# Patient Record
Sex: FEMALE | Race: WHITE | NOT HISPANIC OR LATINO | Employment: OTHER | URBAN - METROPOLITAN AREA
[De-identification: names, ages, dates, MRNs, and addresses within clinical notes are randomized per-mention and may not be internally consistent; named-entity substitution may affect disease eponyms.]

---

## 2017-01-24 ENCOUNTER — TRANSFERRED RECORDS (OUTPATIENT)
Dept: HEALTH INFORMATION MANAGEMENT | Facility: CLINIC | Age: 62
End: 2017-01-24

## 2017-02-16 ENCOUNTER — OFFICE VISIT (OUTPATIENT)
Dept: NEUROLOGY | Facility: CLINIC | Age: 62
End: 2017-02-16

## 2017-02-16 VITALS — RESPIRATION RATE: 18 BRPM | SYSTOLIC BLOOD PRESSURE: 116 MMHG | HEART RATE: 85 BPM | DIASTOLIC BLOOD PRESSURE: 61 MMHG

## 2017-02-16 DIAGNOSIS — G44.201 INTRACTABLE TENSION-TYPE HEADACHE, UNSPECIFIED CHRONICITY PATTERN: Primary | ICD-10-CM

## 2017-02-16 DIAGNOSIS — S06.0X0A CONCUSSION, WITHOUT LOSS OF CONSCIOUSNESS, INITIAL ENCOUNTER: ICD-10-CM

## 2017-02-16 DIAGNOSIS — M54.2 NECK PAIN: ICD-10-CM

## 2017-02-16 RX ORDER — TRIMETHOBENZAMIDE HYDROCHLORIDE 300 MG/1
300 CAPSULE ORAL 3 TIMES DAILY PRN
COMMUNITY
End: 2017-02-16

## 2017-02-16 RX ORDER — PROMETHAZINE HYDROCHLORIDE 25 MG/1
TABLET ORAL EVERY 6 HOURS PRN
COMMUNITY

## 2017-02-16 RX ORDER — SENNOSIDES 8.6 MG
1 TABLET ORAL DAILY
COMMUNITY

## 2017-02-16 RX ORDER — MECLIZINE HCL 25MG 25 MG/1
25 TABLET, CHEWABLE ORAL EVERY 6 HOURS PRN
COMMUNITY

## 2017-02-16 RX ORDER — ZINC OXIDE 20 %
OINTMENT (GRAM) TOPICAL PRN
COMMUNITY

## 2017-02-16 RX ORDER — POLYETHYLENE GLYCOL 3350 17 G/17G
1 POWDER, FOR SOLUTION ORAL DAILY
COMMUNITY

## 2017-02-16 RX ORDER — SUCRALFATE 1 G/1
1 TABLET ORAL 4 TIMES DAILY
COMMUNITY

## 2017-02-16 RX ORDER — CYANOCOBALAMIN 1000 UG/ML
1 INJECTION, SOLUTION INTRAMUSCULAR; SUBCUTANEOUS
COMMUNITY

## 2017-02-16 ASSESSMENT — PAIN SCALES - GENERAL: PAINLEVEL: SEVERE PAIN (6)

## 2017-02-16 NOTE — MR AVS SNAPSHOT
After Visit Summary   2/16/2017    Shirlene Mena    MRN: [id number]           Patient Information     Date Of Birth          1955        Visit Information        Provider Department      2/16/2017 12:30 PM Jose G Medina MD [facility]        Today's Diagnoses     Intractable tension-type headache, unspecified chronicity pattern    -  1    Neck pain        Concussion, without loss of consciousness, initial encounter           Follow-ups after your visit        Follow-up notes from your care team     Discussed this visit Return in about 1 week (around 2/23/2017).      Future tests that were ordered for you today     Open Future Orders        Priority Expected Expires Ordered    CT Head w/o Contrast Routine 2/22/2017 2/16/2018 2/16/2017    CT Cervical Spine w/o Contrast Routine 2/21/2017 2/16/2018 2/16/2017            Who to contact     Please call your clinic at [phone] to:    Ask questions about your health    Make or cancel appointments    Discuss your medicines    Learn about your test results    Speak to your doctor   If you have compliments or concerns about an experience at your clinic, or if you wish to file a complaint, please contact [facility] Patient Relations at [phone] or email us at [email or web address]         Additional Information About Your Visit        MyChart Information     Rosum is an electronic gateway that provides easy, online access to your medical records. With Rosum, you can request a clinic appointment, read your test results, renew a prescription or communicate with your care team.     To sign up for nCinot visit the website at [email or web address]   You will be asked to enter the access code listed below, as well as some personal information. Please follow the directions to create your username and password.     Your access code is: [email or web address]  Expires:  2017  1:13 PM     Your access code will  in 90 days. If you need help or a new code, please contact your [facility] or call [phone] for assistance.        Care EveryWhere ID     This is your Care EveryWhere ID. This could be used by other organizations to access your [place] medical records  [id number]        Your Vitals Were     Pulse Respirations                85 18           Blood Pressure from Last 3 Encounters:   17 116/61    Weight from Last 3 Encounters:   No data found for Wt                 Today's Medication Changes          These changes are accurate as of: 17  1:13 PM.  If you have any questions, ask your nurse or doctor.               These medicines have changed or have updated prescriptions.        Dose/Directions    amitriptyline 25 MG tablet   Commonly known as:  ELAVIL   This may have changed:    - medication strength  - how much to take   Used for:  Intractable tension-type headache, unspecified chronicity pattern, Neck pain, Concussion, without loss of consciousness, initial encounter        Dose:  50 mg   Take 2 tablets (50 mg) by mouth At Bedtime   Quantity:  60 tablet   Refills:  1            Where to get your medicines      These medications were sent to [place] [address] [place] [id number] [facility] [address]     Phone:  [phone]     amitriptyline 25 MG tablet                Primary Care Provider Office Phone # Fax #    Ivette Gandhi [phone]       [facility] [address] [facility] [address]        Thank you!     Thank you for choosing [facility]  for your care. Our goal is always to provide you with excellent care. Hearing back from our patients is one way we can continue to improve our services. Please take a few minutes to complete the written survey that you may receive in the mail after your visit with us.  Thank you!             Your Updated Medication List - Protect others around you: Learn how to safely use, store and throw away your medicines at www.disposemymeds.org.          This list is accurate as of: 2/16/17  1:13 PM.  Always use your most recent med list.                   Brand Name Dispense Instructions for use    amitriptyline 25 MG tablet    ELAVIL    60 tablet    Take 2 tablets (50 mg) by mouth At Bedtime       BENZONATATE PO      Take 100 mg by mouth 3 times daily as needed for cough       cyanocobalamin 1000 MCG/ML injection    VITAMIN B12     Inject 1 mL into the muscle every 30 days       LEVOTHYROXINE SODIUM PO      Take 88 mcg by mouth daily       meclizine 25 MG Chew      Take 25 mg by mouth every 6 hours as needed for dizziness       pantoprazole suspension    PROTONIX     Take 40 mg by mouth daily       polyethylene glycol Packet    MIRALAX/GLYCOLAX     Take 1 packet by mouth daily       promethazine 25 MG tablet    PHENERGAN     Take by mouth every 6 hours as needed for nausea       sennosides 8.6 MG tablet    SENOKOT     Take 1 tablet by mouth daily       sucralfate 1 GM tablet    CARAFATE     Take 1 g by mouth 4 times daily       TRAMADOL HCL PO      Take 50 mg by mouth 3 times daily       TRAZODONE HCL PO      Take 100 mg by mouth At Bedtime       VITAMIN D (CHOLECALCIFEROL) PO      Take 1,000 Units by mouth daily       ZANAFLEX PO      Take 4 mg by mouth once       zinc oxide 20 % ointment      Apply topically as needed for dry skin or irritation       ZOCOR PO      Take 20 mg by mouth At Bedtime       ZOFRAN ODT PO      Take 4 mg by mouth once
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
## 2017-02-20 ENCOUNTER — TRANSFERRED RECORDS (OUTPATIENT)
Dept: HEALTH INFORMATION MANAGEMENT | Facility: CLINIC | Age: 62
End: 2017-02-20

## 2017-02-24 ENCOUNTER — OFFICE VISIT (OUTPATIENT)
Dept: NEUROLOGY | Facility: CLINIC | Age: 62
End: 2017-02-24

## 2017-02-24 VITALS — HEART RATE: 84 BPM | SYSTOLIC BLOOD PRESSURE: 120 MMHG | DIASTOLIC BLOOD PRESSURE: 55 MMHG | RESPIRATION RATE: 18 BRPM

## 2017-02-24 DIAGNOSIS — S06.0X0A CONCUSSION, WITHOUT LOSS OF CONSCIOUSNESS, INITIAL ENCOUNTER: Primary | ICD-10-CM

## 2017-02-24 DIAGNOSIS — G44.201 INTRACTABLE TENSION-TYPE HEADACHE, UNSPECIFIED CHRONICITY PATTERN: ICD-10-CM

## 2017-02-24 DIAGNOSIS — M54.2 NECK PAIN: ICD-10-CM

## 2017-02-24 ASSESSMENT — PAIN SCALES - GENERAL: PAINLEVEL: SEVERE PAIN (7)
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
## 2017-07-13 ENCOUNTER — OFFICE VISIT (OUTPATIENT)
Dept: NEUROLOGY | Facility: CLINIC | Age: 62
End: 2017-07-13

## 2017-07-13 VITALS — DIASTOLIC BLOOD PRESSURE: 74 MMHG | HEART RATE: 78 BPM | SYSTOLIC BLOOD PRESSURE: 104 MMHG | RESPIRATION RATE: 18 BRPM

## 2017-07-13 DIAGNOSIS — G44.201 INTRACTABLE TENSION-TYPE HEADACHE, UNSPECIFIED CHRONICITY PATTERN: Primary | ICD-10-CM

## 2017-07-13 DIAGNOSIS — M54.2 NECK PAIN: ICD-10-CM

## 2017-07-13 RX ORDER — GABAPENTIN 100 MG/1
CAPSULE ORAL
Qty: 180 CAPSULE | Refills: 1 | Status: SHIPPED | OUTPATIENT
Start: 2017-07-13 | End: 2017-08-10 | Stop reason: SINTOL

## 2017-07-13 ASSESSMENT — PAIN SCALES - GENERAL: PAINLEVEL: NO PAIN (0)
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
## 2017-08-10 ENCOUNTER — OFFICE VISIT (OUTPATIENT)
Dept: NEUROLOGY | Facility: CLINIC | Age: 62
End: 2017-08-10

## 2017-08-10 VITALS — SYSTOLIC BLOOD PRESSURE: 118 MMHG | DIASTOLIC BLOOD PRESSURE: 75 MMHG | HEART RATE: 79 BPM

## 2017-08-10 DIAGNOSIS — M54.2 NECK PAIN: Primary | ICD-10-CM

## 2017-08-10 ASSESSMENT — PAIN SCALES - GENERAL: PAINLEVEL: MODERATE PAIN (4)
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
## 2017-08-10 NOTE — MR AVS SNAPSHOT
After Visit Summary   8/10/2017    Shirlene Mena    MRN: [id number]           Patient Information     Date Of Birth          1955        Visit Information        Provider Department      8/10/2017 11:30 AM Jose G Medina MD [facility]         Follow-ups after your visit        Follow-up notes from your care team     Discussed this visit Return if symptoms worsen or fail to improve.      Who to contact     Please call your clinic at [phone] to:    Ask questions about your health    Make or cancel appointments    Discuss your medicines    Learn about your test results    Speak to your doctor   If you have compliments or concerns about an experience at your clinic, or if you wish to file a complaint, please contact [facility] Patient Relations at [phone] or email us at [email or web address]         Additional Information About Your Visit        MyChart Information     [employer] is an electronic gateway that provides easy, online access to your medical records. With Crossb[employer], you can request a clinic appointment, read your test results, renew a prescription or communicate with your care team.     To sign up for [employer] visit the website at [email or web address]   You will be asked to enter the access code listed below, as well as some personal information. Please follow the directions to create your username and password.     Your access code is: [email or web address]  Expires: 10/11/2017 10:15 AM     Your access code will  in 90 days. If you need help or a new code, please contact your [facility] or call [phone] for assistance.        Care EveryWhere ID     This is your Care EveryWhere ID. This could be used by other organizations to access your [place] medical records  [id number]        Your Vitals Were     Pulse                   79            Blood Pressure from  Last 3 Encounters:   08/10/17 118/75   07/13/17 104/74   02/24/17 120/55    Weight from Last 3 Encounters:   No data found for Wt              Today, you had the following     No orders found for display         Today's Medication Changes          These changes are accurate as of: 8/10/17 11:50 AM.  If you have any questions, ask your nurse or doctor.               These medicines have changed or have updated prescriptions.        Dose/Directions    AMITRIPTYLINE HCL PO   This may have changed:  Another medication with the same name was removed. Continue taking this medication, and follow the directions you see here.        Dose:  50 mg   Take 50 mg by mouth At Bedtime   Refills:  0         Stop taking these medicines if you haven't already. Please contact your care team if you have questions.     gabapentin 100 MG capsule   Commonly known as:  NEURONTIN                    Primary Care Provider Office Phone # Fax Iris Gandhi [phone]       [facility] [address] [facility] [address]        Equal Access to Services     YANETH MEJIA : Hadii nat ku hadasho Soomaali, waaxda luqadaha, qaybta kaalmada adeegyada, waxay jessicain cassandra jhaveri . So [facility] [phone].    ATENCIÓN: Si habla español, tiene a huston disposición servicios gratuitos de asistencia lingüística. Llame al [phone].    We comply with applicable federal civil rights laws and [place] laws. We do not discriminate on the basis of race, color, national origin, age, disability sex, sexual orientation or gender identity.            Thank you!     Thank you for choosing [facility]  for your care. Our goal is always to provide you with excellent care. Hearing back from our patients is one way we can continue to improve our services. Please take a few minutes to complete the written survey that you may receive in the mail after your visit with us. Thank  you!             Your Updated Medication List - Protect others around you: Learn how to safely use, store and throw away your medicines at www.disposemymeds.org.          This list is accurate as of: 8/10/17 11:50 AM.  Always use your most recent med list.                   Brand Name Dispense Instructions for use Diagnosis    AMITRIPTYLINE HCL PO      Take 50 mg by mouth At Bedtime        BENZONATATE PO      Take 100 mg by mouth 3 times daily as needed for cough        cyanocobalamin 1000 MCG/ML injection    VITAMIN B12     Inject 1 mL into the muscle every 30 days        LEVOTHYROXINE SODIUM PO      Take 88 mcg by mouth daily        meclizine 25 MG Chew      Take 25 mg by mouth every 6 hours as needed for dizziness        pantoprazole Susp suspension    PROTONIX     Take 40 mg by mouth daily        polyethylene glycol Packet    MIRALAX/GLYCOLAX     Take 1 packet by mouth daily        promethazine 25 MG tablet    PHENERGAN     Take by mouth every 6 hours as needed for nausea        sennosides 8.6 MG tablet    SENOKOT     Take 1 tablet by mouth daily        sucralfate 1 GM tablet    CARAFATE     Take 1 g by mouth 4 times daily        TRAMADOL HCL PO      Take 50 mg by mouth 3 times daily        TRAZODONE HCL PO      Take 100 mg by mouth At Bedtime        VITAMIN D (CHOLECALCIFEROL) PO      Take 1,000 Units by mouth daily        zinc oxide 20 % ointment      Apply topically as needed for dry skin or irritation        ZOCOR PO      Take 20 mg by mouth At Bedtime        ZOFRAN ODT PO      Take 4 mg by mouth once
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
## 2019-01-11 ENCOUNTER — COMMUNICATION - HEALTHEAST (OUTPATIENT)
Dept: SURGERY | Facility: CLINIC | Age: 64
End: 2019-01-11

## 2019-02-20 NOTE — MR AVS SNAPSHOT
After Visit Summary   2/24/2017    Shirlene Mena    MRN: [id number]           Patient Information     Date Of Birth          1955        Visit Information        Provider Department      2/24/2017 12:30 PM Jose G Medina MD [facility]        Today's Diagnoses     Concussion, without loss of consciousness, initial encounter    -  1    Neck pain        Intractable tension-type headache, unspecified chronicity pattern           Follow-ups after your visit        Additional Services     [facility] Referral       [facility]            Add therapy for neck pain.  [facility] [place] [address]  [phone]                  Follow-up notes from your care team     Discussed this visit Return in about 1 month (around 3/24/2017).      Future tests that were ordered for you today     Open Future Orders        Priority Expected Expires Ordered    [facility] Referral Routine 2/27/2017 2/24/2018 2/24/2017            Who to contact     Please call your clinic at [phone] to:    Ask questions about your health    Make or cancel appointments    Discuss your medicines    Learn about your test results    Speak to your doctor   If you have compliments or concerns about an experience at your clinic, or if you wish to file a complaint, please contact [facility] Patient Relations at [phone] or email us at [email or web address]         Additional Information About Your Visit        MyChart Information     [employer] is an electronic gateway that provides easy, online access to your medical records. With [employer], you can request a clinic appointment, read your test results, renew a prescription or communicate with your care team.     To sign up for [employer] visit the website at  [email or web address]   You will be asked to enter the access code listed below, as well as some personal information. Please follow the directions to create your username and password.     Your access code is: [email or web address]  Expires: 2017  1:13 PM     Your access code will  in 90 days. If you need help or a new code, please contact your [facility] or call [phone] for assistance.        Care EveryWhere ID     This is your Care EveryWhere ID. This could be used by other organizations to access your [facility] medical records  [id number]        Your Vitals Were     Pulse Respirations                84 18           Blood Pressure from Last 3 Encounters:   17 120/55   17 116/61    Weight from Last 3 Encounters:   No data found for Wt               Primary Care Provider Office Phone # Fax #    Ivette Gandhi [phone]       [facility] [address]  [address] [place] [address]        Thank you!     Thank you for choosing [facility]  for your care. Our goal is always to provide you with excellent care. Hearing back from our patients is one way we can continue to improve our services. Please take a few minutes to complete the written survey that you may receive in the mail after your visit with us. Thank you!             Your Updated Medication List - Protect others around you: Learn how to safely use, store and throw away your medicines at www.disposemymeds.org.          This list is accurate as of: 17 12:41 PM.  Always use your most recent med list.                   Brand Name Dispense Instructions for use    amitriptyline 25 MG tablet    ELAVIL    60 tablet    Take 2 tablets (50 mg) by mouth At Bedtime       BENZONATATE PO      Take 100 mg by mouth 3 times daily as needed for cough       cyanocobalamin 1000 MCG/ML injection    VITAMIN B12     Inject 1 mL into the muscle every 30  days       LEVOTHYROXINE SODIUM PO      Take 88 mcg by mouth daily       meclizine 25 MG Chew      Take 25 mg by mouth every 6 hours as needed for dizziness       pantoprazole suspension    PROTONIX     Take 40 mg by mouth daily       polyethylene glycol Packet    MIRALAX/GLYCOLAX     Take 1 packet by mouth daily       promethazine 25 MG tablet    PHENERGAN     Take by mouth every 6 hours as needed for nausea       sennosides 8.6 MG tablet    SENOKOT     Take 1 tablet by mouth daily       sucralfate 1 GM tablet    CARAFATE     Take 1 g by mouth 4 times daily       TRAMADOL HCL PO      Take 50 mg by mouth 3 times daily       TRAZODONE HCL PO      Take 100 mg by mouth At Bedtime       VITAMIN D (CHOLECALCIFEROL) PO      Take 1,000 Units by mouth daily       ZANAFLEX PO      Take 4 mg by mouth once       zinc oxide 20 % ointment      Apply topically as needed for dry skin or irritation       ZOCOR PO      Take 20 mg by mouth At Bedtime       ZOFRAN ODT PO      Take 4 mg by mouth once          98

## 2021-06-08 ENCOUNTER — RECORDS - HEALTHEAST (OUTPATIENT)
Dept: LAB | Facility: CLINIC | Age: 66
End: 2021-06-08

## 2021-06-08 LAB
ALBUMIN UR-MCNC: NEGATIVE G/DL
APPEARANCE UR: CLEAR
BACTERIA #/AREA URNS HPF: ABNORMAL /[HPF]
BILIRUB UR QL STRIP: NEGATIVE
COLOR UR AUTO: ABNORMAL
GLUCOSE UR STRIP-MCNC: NEGATIVE MG/DL
HGB UR QL STRIP: NEGATIVE
HYALINE CASTS: 1 LPF
KETONES UR STRIP-MCNC: NEGATIVE MG/DL
LEUKOCYTE ESTERASE UR QL STRIP: ABNORMAL
NITRATE UR QL: NEGATIVE
PH UR STRIP: 5 [PH] (ref 5–8)
RBC URINE: 1 HPF
SP GR UR STRIP: 1.01 (ref 1–1.03)
SQUAMOUS EPITHELIAL: <1 /HPF
UROBILINOGEN UR STRIP-ACNC: ABNORMAL
WBC URINE: 6 HPF

## 2021-06-09 LAB — BACTERIA SPEC CULT: NO GROWTH

## 2021-08-14 ENCOUNTER — HEALTH MAINTENANCE LETTER (OUTPATIENT)
Age: 66
End: 2021-08-14

## 2021-10-09 ENCOUNTER — HEALTH MAINTENANCE LETTER (OUTPATIENT)
Age: 66
End: 2021-10-09

## 2022-09-11 ENCOUNTER — HEALTH MAINTENANCE LETTER (OUTPATIENT)
Age: 67
End: 2022-09-11

## 2023-02-10 ENCOUNTER — TRANSCRIBE ORDERS (OUTPATIENT)
Dept: OTHER | Age: 68
End: 2023-02-10

## 2023-02-10 DIAGNOSIS — T50.905D ADVERSE EFFECT OF DRUG, SUBSEQUENT ENCOUNTER: Primary | ICD-10-CM

## 2023-04-05 NOTE — TELEPHONE ENCOUNTER
FUTURE VISIT INFORMATION      FUTURE VISIT INFORMATION:    Date: 6.28.23    Time: 7:30    [place] [facility] [place] INFORMATION:    Referring provider:  Leida    Referring providers clinic:  [facility]    Reason for visit/diagnosis  Adverse effect of drug, subsequent encounter; Referred to    Dr Coombs for drug allergy evaluation - Referred by Dr Jonathan Casarez MD at [facility] in [place] (Phone: [phone], Fax: [phone]) - Recs in Care Everywhere from [facility] - no other Recs - per Pt Shirlene    RECORDS REQUESTED FROM:       Clinic name Comments Records Status Imaging Status   [facility] 2.7.23  Leida CE    [facility] 1.20-1.23.23  [facility] 1.20.23  Vivienne CAICEDO

## 2023-06-28 ENCOUNTER — PRE VISIT (OUTPATIENT)
Dept: ALLERGY | Facility: CLINIC | Age: 68
End: 2023-06-28

## 2023-06-28 ENCOUNTER — OFFICE VISIT (OUTPATIENT)
Dept: ALLERGY | Facility: CLINIC | Age: 68
End: 2023-06-28
Payer: COMMERCIAL

## 2023-06-28 DIAGNOSIS — T50.905D ADVERSE EFFECT OF DRUG, SUBSEQUENT ENCOUNTER: ICD-10-CM

## 2023-06-28 DIAGNOSIS — R09.82 ALLERGIC RHINITIS WITH POSTNASAL DRIP: ICD-10-CM

## 2023-06-28 DIAGNOSIS — T88.7XXA DRUG-INDUCED HYPERSENSITIVITY SYNDROME, INITIAL ENCOUNTER: ICD-10-CM

## 2023-06-28 DIAGNOSIS — Z88.9 MULTIPLE DRUG ALLERGIES: Primary | ICD-10-CM

## 2023-06-28 DIAGNOSIS — Z88.9 ATOPY: ICD-10-CM

## 2023-06-28 DIAGNOSIS — J30.9 ALLERGIC RHINITIS WITH POSTNASAL DRIP: ICD-10-CM

## 2023-06-28 DIAGNOSIS — R05.3 CHRONIC COUGH: ICD-10-CM

## 2023-06-28 DIAGNOSIS — T50.905A DRUG-INDUCED HYPERSENSITIVITY SYNDROME, INITIAL ENCOUNTER: ICD-10-CM

## 2023-06-28 PROCEDURE — 95004 PERQ TESTS W/ALRGNC XTRCS: CPT | Performed by: DERMATOLOGY

## 2023-06-28 PROCEDURE — 99204 OFFICE O/P NEW MOD 45 MIN: CPT | Mod: 25 | Performed by: DERMATOLOGY

## 2023-06-28 NOTE — Clinical Note
6/28/2023         RE: Shirlene Mena  [address] [place] [address]        Dear Colleague,    Thank you for referring your patient, Shirlene Mena, to the [facility] [place]. Please see a copy of my visit note below.    [facility] Dermato-[facility]ology Note  Office visit  Encounter Date: Jun 28, 2023  ____________________________________________    CC: No chief complaint on file.      HPI:  (Jun 28, 2023)  Ms. Shirlene Mena is a(n) 68 year old female who presents today as new patient for allergy consultation    - patient has recurrent UTI's   --> see below the hx of Dr. Casarez.   Was taking 3 days the Ciprofloxacine and then developed fever, joint pain and a rash (first on arms and then all over the body, including face). Seems to be an erythematous, macular, confluent rash with pruritus. 3 weeks later the skin was peeling off.  --> labs from 1/20/2023 (2 weeks after onset of rash) = still elevated CRP 8.78 (UTI?), and still elevated Eosinophils absolute (0.8 normal <0.5) and then Glucose elevated 193 (probably the steroids). Liver parameters not elevated. CBC diff normal in June 2023    --> to Macrobid (Nitrofurantoin) about 10 years ago after few days also fever, rash and chills and afterwards peeling    --> cannot take Advil or Aleve because of gastric bypass surgery (2014). If pain takes only Tylenol. Cannot remember taking Tylenol. Took Tylenol after Jan 2023 without problems    - otherwise feeling well in usual state of health    Physical exam:  General: In no acute distress, well-developed, well-nourished  Eyes: no conjunctivitis  ENT: no signs of rhinitis   Pulmonary: no wheezing or coughing  Skin: Focused examination of the skin on test sites was performed = see test results below  {Skin Exam:791229}    Past Medical History:   Patient Active Problem List   Diagnosis     Intractable tension-type headache, unspecified chronicity pattern     Neck pain      Concussion, without loss of consciousness, initial encounter     No past medical history on file.    Allergies:  Allergies   Allergen Reactions     Azithromycin      Stomach pain      Codeine      Nausea      Lisinopril      coughing     Macrolides And Ketolides Hives     Sulfa Antibiotics        Medications:  Current Outpatient Medications   Medication     AMITRIPTYLINE HCL PO     BENZONATATE PO     cyanocobalamin (VITAMIN B12) 1000 MCG/ML injection     LEVOTHYROXINE SODIUM PO     meclizine 25 MG CHEW     Ondansetron (ZOFRAN ODT PO)     pantoprazole (PROTONIX) suspension     polyethylene glycol (MIRALAX/GLYCOLAX) Packet     promethazine (PHENERGAN) 25 MG tablet     sennosides (SENOKOT) 8.6 MG tablet     Simvastatin (ZOCOR PO)     sucralfate (CARAFATE) 1 GM tablet     TRAMADOL HCL PO     TRAZODONE HCL PO     VITAMIN D, CHOLECALCIFEROL, PO     zinc oxide 20 % ointment     No current facility-administered medications for this visit.       Social History:  The patient {works:720852}. Patient has the following hobbies or non-occupational exposure ***    Family History:  No family history on file.    Previous Labs, Allergy Tests, Dermatopathology, Imaging:    Dr. DEMETRIUS Casarez consult 02/07/2023  HPI: Shirlene has history of possible allergy to drug. Recently had urinary tract infection. This was on January 2. She took a course of Ceftin. Completed a course. Did not feel better. Thereafter started Cipro. Finished 3-day course. Then after 3 days she developed chills and on the fourth day developed fever and rash on the body. Rash was head to toe along with fever. She went to the emergency room. Along with fever she also had joint aches and pains. She wonders if this was an allergic reaction from the drug.      Referred By: Jonathan Casarez MD  [address] [place] [address]     Allergy Tests:    Past Allergy Test    Order for Future Allergy Testing:    [] Outpatient  [] Inpatient: [facility]..../ Bed ....       Skin Atopy  (atopic dermatitis) [] Yes   [x] No .........  Contact allergies:   [] Yes   [x] No ..........  Hand eczema:   [] Yes   [x] No           Leading hand:   [] R   [] L       [] Ambidextrous         Drug allergies:        [x] Yes   [] No  Which?.see above    Urticaria/Angioedema  [] Yes   [x] No .........  Food Allergy:  [] Yes   [x] No  which?......  Pets :  [] Yes   [x] No  Which?..no reactions to pets      []  Rhinitis   [] Conjunctivitis   [] Sinusitis   [] Polyposis   [] Otitis   [x] Pharyngitis         [x]  Postnasal drip    []  none  Operations:   [] Tonsils   [] Septum   [] Sinus   [] Polyposis        [] Asthma bronchiale   [x] Coughing      []  none  Symptoms (mostly Rhinoconjunctivitis and Asthma) aggravated by:  Season   [] I   [] II   [] III   [] IV   []V   []VI   []VII   []VIII   []IX   []X   []XI   []XII     [x] perennial   Day time      [] morning   [] noon      [] evening        [] night    [] whole day........  [x]  none  Location/changes    [] inside        [] outside   [] mountains    [] sea     [] others.............   [x]  none  Triggers, specific     [] animals     [] plants     [] dust              [] others ...........................    [x]  none  Triggers, others       [] work          [] psyche    [] sport            [] others .............................  [x]  none  Irritant                [] phys efforts [] smoke    [] heat/cold     [] odors  []others............... [x]  none    Order for PATCH TESTS  Reason for tests (suspected allergy): not necessary  Known previous allergies: n/a  Standardized panels  [] Standard panel (40 tests)  [] Preservatives & Antimicrobials (31 tests)  [] Emulsifiers & Additives (25 tests)   [] Perfumes/Flavours & Plants (25 tests)  [] Hairdresser panel (12 tests)  [] Rubber Chemicals (22 tests)  [] Plastics (26 tests)  [] Colorants/Dyes/Food additives (20 tests)  [] Metals (implants/dental) (24 tests)  [] Local anaesthetics/NSAIDs (13 tests)  [] Antibiotics &  Antimycotics (14 tests)   [] Corticosteroids (15 tests)   [] Photopatch test (62 tests)   [] others: ...      [] Patient's own products: ...    DO NOT test if chemical or biological identity is unknown!     always ask from patient the product information and safety sheets (MSDS)       Order for PRICK TESTS    Reason for tests (suspected allergy): atopy and perennial coughing and PND and Rhinitis  Known previous allergies: previous prick tests negatives    Standardized prick panels  [x] Atopic panel (20 tests)  [] Pediatric Panel (12 tests)  [] Milk, Meat, Eggs, Grains (20 tests)   [] Dust, Epithelia, Feathers (10 tests)  [] Fish, Seafood, Shellfish (17 tests)  [] Nuts, Beans (14 tests)  [] Spice, Vegetable, Fruit (17 tests)  [] Pollen Panel = Tree, Grass, Weed (24 tests)  [] Others: ...      [] Patient's own products: ...    DO NOT test if chemical or biological identity is unknown!     always ask from patient the product information and safety sheets (MSDS)     Standardized intradermal tests  [x] Penicillium notatum [x] Aspergillus fumigatus [x] House dust mites D.far & D. pteron  [] Cat    [] dog  [x] Others: Alternaria  [] Bee venom   [] Wasp venom  !!Specific protocol with dilutions!!       Order for Drug allergy tests (prick & Intradermal & patch tests)    [] Penicillin G  [] Ampicillin [] Cefazolin   [] Ceftriaxone   [] Ceftazidime  [x] Bactrim    [x] Others: Nitrofurantoin, Ciprofloxacin, Moxifloxacin  Order for ... as test date      Atopy Screen (Placed Jun 28, 2023)  No Substance Readings (15 min) Evaluation   POS Histamine 1mg/ml +    NEG NaCl 0.9% -      No Substance Readings (15 min) Evaluation   1 Alternaria alternata (tenuis)  -    2 Cladosporium herbarum -    3 Aspergillus fumigatus -    4 Penicillium notatum -    5 Dermatophagoides pteronyssinus -    6 Dermatophagoides farinae -    7 Dog epithelium (canis spp) -    8 Cat hair (adrian catus) -    9 Cockroach   (Blatella americana & germanica) -    10  Grass mix midwest   (Brittany, Orchard, Redtop, Sergio) -    11 Enzo grass (sorghum halepense) -    12 Weed mix   (common Cocklebur, Lamb s quarters, rough redroot Pigweed, Dock/Sorrel) -    13 Mug wort (artemisia vulgare) -    14 Ragweed giant/short (ambrosia spp) -    15 White birch (Betula papyrifera) -    16 Tree mix 1 (Pecan, Maple BHR, Oak RVW, american [place], black [place]) -    17 Red cedar (juniperus virginia) -    18 Tree mix 2   (white Vel, river/red Birch, black [place], common Kay, american Elm) -    19 Box elder/Maple mix (acer spp) -    20 [place] shagbark (carya ovata) -           Conclusion: no clear signs for immediate type reaction in prick test        ________________________________    Assessment & Plan:    ==> Final Diagnosis:     # multiple drug allergies    Suspicion for DRESS to Ciprofloxacine    Suspicion for DRESS? to Nitrofurantoin    Sulfa drugs (Bactrim) cannot remember    Azithromycine GI issues (pain) = side effects  {jgstatus:853230}    # suspicion for atopic predisposition with    Perennial PND, Rhinitis and coughing = allergic?  {jgstatus:749967}      These conclusions are made at the best of one's knowledge and belief based on the provided evidence such as patient's history and allergy test results and they can change over time or can be incomplete because of missing information's.    ==> Treatment Plan:  >> put Ciprofloxacin and Nitrofurantoin in Epic drug allergy list      Procedures Performed: Allergy tests, including prick tests    Staff:  Provider    Follow-up in Derm-Allergy clinic for drug allergy testing with delayed reading appointment 4-5 days later. Will do then as well IDT to environmental allergies    I spent a total of 40 minutes with Shirlene Mena. This time was spent counseling the patient and/or coordinating care, explaining the allergy tests , performing allergy tests and assessing the clinical relevance.        Again, thank you for allowing me to  participate in the care of your patient.        Sincerely,        Frantz Coombs MD

## 2023-06-28 NOTE — PROGRESS NOTES
[facility] Dermato-allergology Note  Office visit  Encounter Date: Jun 28, 2023  ____________________________________________    CC: No chief complaint on file.      HPI:  (Jun 28, 2023)  Ms. Shirlene Mena is a(n) 68 year old female who presents today as new patient for allergy consultation    - patient has recurrent UTI's   --> see below the hx of Dr. Casarez.   Was taking 3 days the Ciprofloxacine and then developed fever, joint pain and a rash (first on arms and then all over the body, including face). Seems to be an erythematous, macular, confluent rash with pruritus. 3 weeks later the skin was peeling off.  --> labs from 1/20/2023 (2 weeks after onset of rash) = still elevated CRP 8.78 (UTI?), and still elevated Eosinophils absolute (0.8 normal <0.5) and then Glucose elevated 193 (probably the steroids). Liver parameters not elevated. CBC diff normal in June 2023    --> to Macrobid (Nitrofurantoin) about 10 years ago after few days also fever, rash and chills and afterwards peeling    --> cannot take Advil or Aleve because of gastric bypass surgery (2014). If pain takes only Tylenol. Cannot remember taking Tylenol. Took Tylenol after Jan 2023 without problems    - otherwise feeling well in usual state of health    Physical exam:  General: In no acute distress, well-developed, well-nourished  Eyes: no conjunctivitis  ENT: no signs of rhinitis   Pulmonary: no wheezing or coughing  Skin: Focused examination of the skin on test sites was performed = see test results below      Past Medical History:   Patient Active Problem List   Diagnosis     Intractable tension-type headache, unspecified chronicity pattern     Neck pain     Concussion, without loss of consciousness, initial encounter     No past medical history on file.    Allergies:  Allergies   Allergen Reactions     Azithromycin      Stomach pain      Codeine      Nausea      Lisinopril      coughing     Macrolides And Ketolides Hives      Sulfa Antibiotics        Medications:  Current Outpatient Medications   Medication     AMITRIPTYLINE HCL PO     BENZONATATE PO     cyanocobalamin (VITAMIN B12) 1000 MCG/ML injection     LEVOTHYROXINE SODIUM PO     meclizine 25 MG CHEW     Ondansetron (ZOFRAN ODT PO)     pantoprazole (PROTONIX) suspension     polyethylene glycol (MIRALAX/GLYCOLAX) Packet     promethazine (PHENERGAN) 25 MG tablet     sennosides (SENOKOT) 8.6 MG tablet     Simvastatin (ZOCOR PO)     sucralfate (CARAFATE) 1 GM tablet     TRAMADOL HCL PO     TRAZODONE HCL PO     VITAMIN D, CHOLECALCIFEROL, PO     zinc oxide 20 % ointment     No current facility-administered medications for this visit.       Social History:  The patient is retired. Patient has the following hobbies or non-occupational exposure none    Family History:  No family history on file.    Previous Labs, Allergy Tests, Dermatopathology, Imaging:    Dr. DEMETRIUS Casarez consult 02/07/2023  HPI: Shirlene has history of possible allergy to drug. Recently had urinary tract infection. This was on January 2. She took a course of Ceftin. Completed a course. Did not feel better. Thereafter started Cipro. Finished 3-day course. Then after 3 days she developed chills and on the fourth day developed fever and rash on the body. Rash was head to toe along with fever. She went to the emergency room. Along with fever she also had joint aches and pains. She wonders if this was an allergic reaction from the drug.      Referred By: Jonathan Casarez MD  [address] [place] [address]     Allergy Tests:    Past Allergy Test    Order for Future Allergy Testing:    [] Outpatient  [] Inpatient: [facility]..../ Bed ....       Skin Atopy (atopic dermatitis) [] Yes   [x] No .........  Contact allergies:   [] Yes   [x] No ..........  Hand eczema:   [] Yes   [x] No           Leading hand:   [] R   [] L       [] Ambidextrous         Drug allergies:        [x] Yes   [] No  Which?.see  above    Urticaria/Angioedema  [] Yes   [x] No .........  Food Allergy:  [] Yes   [x] No  which?......  Pets :  [] Yes   [x] No  Which?..no reactions to pets      []  Rhinitis   [] Conjunctivitis   [] Sinusitis   [] Polyposis   [] Otitis   [x] Pharyngitis         [x]  Postnasal drip    []  none  Operations:   [] Tonsils   [] Septum   [] Sinus   [] Polyposis        [] Asthma bronchiale   [x] Coughing      []  none  Symptoms (mostly Rhinoconjunctivitis and Asthma) aggravated by:  Season   [] I   [] II   [] III   [] IV   []V   []VI   []VII   []VIII   []IX   []X   []XI   []XII     [x] perennial   Day time      [] morning   [] noon      [] evening        [] night    [] whole day........  [x]  none  Location/changes    [] inside        [] outside   [] mountains    [] sea     [] others.............   [x]  none  Triggers, specific     [] animals     [] plants     [] dust              [] others ...........................    [x]  none  Triggers, others       [] work          [] psyche    [] sport            [] others .............................  [x]  none  Irritant                [] phys efforts [] smoke    [] heat/cold     [] odors  []others............... [x]  none    Order for PATCH TESTS  Reason for tests (suspected allergy): not necessary  Known previous allergies: n/a  Standardized panels  [] Standard panel (40 tests)  [] Preservatives & Antimicrobials (31 tests)  [] Emulsifiers & Additives (25 tests)   [] Perfumes/Flavours & Plants (25 tests)  [] Hairdresser panel (12 tests)  [] Rubber Chemicals (22 tests)  [] Plastics (26 tests)  [] Colorants/Dyes/Food additives (20 tests)  [] Metals (implants/dental) (24 tests)  [] Local anaesthetics/NSAIDs (13 tests)  [] Antibiotics & Antimycotics (14 tests)   [] Corticosteroids (15 tests)   [] Photopatch test (62 tests)   [] others: ...      [] Patient's own products: ...    DO NOT test if chemical or biological identity is unknown!     always ask from patient the product  information and safety sheets (MSDS)       Order for PRICK TESTS    Reason for tests (suspected allergy): atopy and perennial coughing and PND and Rhinitis  Known previous allergies: previous prick tests negatives    Standardized prick panels  [x] Atopic panel (20 tests)  [] Pediatric Panel (12 tests)  [] Milk, Meat, Eggs, Grains (20 tests)   [] Dust, Epithelia, Feathers (10 tests)  [] Fish, Seafood, Shellfish (17 tests)  [] Nuts, Beans (14 tests)  [] Spice, Vegetable, Fruit (17 tests)  [] Pollen Panel = Tree, Grass, Weed (24 tests)  [] Others: ...      [] Patient's own products: ...    DO NOT test if chemical or biological identity is unknown!     always ask from patient the product information and safety sheets (MSDS)     Standardized intradermal tests  [x] Penicillium notatum [x] Aspergillus fumigatus [x] House dust mites D.far & D. pteron  [] Cat    [] dog  [x] Others: Alternaria  [] Bee venom   [] Wasp venom  !!Specific protocol with dilutions!!       Order for Drug allergy tests (prick & Intradermal & patch tests)    [] Penicillin G  [] Ampicillin [] Cefazolin   [] Ceftriaxone   [] Ceftazidime  [x] Bactrim    [x] Others: Nitrofurantoin, Ciprofloxacin, Moxifloxacin  Order for ... as test date      Atopy Screen (Placed Jun 28, 2023)  No Substance Readings (15 min) Evaluation   POS Histamine 1mg/ml +    NEG NaCl 0.9% -      No Substance Readings (15 min) Evaluation   1 Alternaria alternata (tenuis)  -    2 Cladosporium herbarum -    3 Aspergillus fumigatus -    4 Penicillium notatum -    5 Dermatophagoides pteronyssinus -    6 Dermatophagoides farinae -    7 Dog epithelium (canis spp) -    8 Cat hair (adrian catus) -    9 Cockroach   (Blatella americana & germanica) -    10 Grass mix midwest   (Brittany, Orchard, Redtop, Sergio) -    11 Enzo grass (sorghum halepense) -    12 Weed mix   (common Cocklebur, Lamb s quarters, rough redroot Pigweed, Dock/Sorrel) -    13 Mug wort (artemisia vulgare) -    14 Ragweed  giant/short (ambrosia spp) -    15 White birch (Betula papyrifera) -    16 Tree mix 1 (Pecan, Maple BHR, Oak RVW, american [place], black [place]) -    17 Red cedar (juniperus virginia) -    18 Tree mix 2   (white Vel, river/red Birch, black [place], common [place], american Elm) -    19 Box elder/Maple mix (acer spp) -    20 [place] shagbark (carya ovata) -           Conclusion: no clear signs for immediate type reaction in prick test        ________________________________    Assessment & Plan:    ==> Final Diagnosis:     # multiple drug allergies    Suspicion for DRESS to Ciprofloxacine    Suspicion for DRESS? to Nitrofurantoin    Sulfa drugs (Bactrim) cannot remember    Azithromycine GI issues (pain) = side effects  * acute illness with systemic symptoms    # suspicion for atopic predisposition with    Perennial PND, Rhinitis and coughing = allergic?  * stable chronic illness      These conclusions are made at the best of one's knowledge and belief based on the provided evidence such as patient's history and allergy test results and they can change over time or can be incomplete because of missing information's.    ==> Treatment Plan:  >> put Ciprofloxacin and Nitrofurantoin in Epic drug allergy list      Procedures Performed: Allergy tests, including prick tests    Staff:  Provider    Follow-up in Derm-Allergy clinic for drug allergy testing with delayed reading appointment 4-5 days later. Will do then as well IDT to environmental allergies    I spent a total of 40 minutes with Shirlene Mena. This time was spent counseling the patient and/or coordinating care, explaining the allergy tests , performing allergy tests and assessing the clinical relevance.

## 2023-06-28 NOTE — NURSING NOTE
"Chief Complaint   Patient presents with     Allergy Consult     Allergy consult, has become \"immune to most antibiotics\". States she became septic in hospital from taking cipro. Worked with bleach and it affected her airway, still has congestion/voice changes     Tari ENCISO RN-BSN  Dermatology Surgery  [phone]        "

## 2023-08-04 DIAGNOSIS — Z88.9 MULTIPLE DRUG ALLERGIES: Primary | ICD-10-CM

## 2023-08-04 RX ORDER — MOXIFLOXACIN HYDROCHLORIDE 400 MG/1
400 TABLET ORAL ONCE
Status: COMPLETED | OUTPATIENT
Start: 2023-08-04 | End: 2023-08-08

## 2023-08-04 RX ORDER — NITROFURANTOIN 25; 75 MG/1; MG/1
100 CAPSULE ORAL ONCE
Status: COMPLETED | OUTPATIENT
Start: 2023-08-08 | End: 2023-08-08

## 2023-08-04 NOTE — PROGRESS NOTES
[facility] Dermato-allergology Note  Office visit  Encounter Date: Aug 8, 2023  ____________________________________________    CC: No chief complaint on file.      HPI:  (Aug 8, 2023)  Ms. Shirlene Mena is a(n) 68 year old female who presents today as a return patient for allergy consultation  - Follow-up in Derm-Allergy clinic for drug allergy testing with delayed reading appointment 4-5 days later. Will do then as well IDT to environmental allergies  - otherwise feeling well in usual state of health    Physical exam:  General: In no acute distress, well-developed, well-nourished  Eyes: no conjunctivitis  ENT: no signs of rhinitis   Pulmonary: no wheezing or coughing  Skin:Focused examination of the skin on test sites was performed = see test results below    Earlier History and Allergy exams:  (Jun 28, 2023)    - patient has recurrent UTI's   --> see below the hx of Dr. Casarez.   Was taking 3 days the Ciprofloxacine and then developed fever, joint pain and a rash (first on arms and then all over the body, including face). Seems to be an erythematous, macular, confluent rash with pruritus. 3 weeks later the skin was peeling off.  --> labs from 1/20/2023 (2 weeks after onset of rash) = still elevated CRP 8.78 (UTI?), and still elevated Eosinophils absolute (0.8 normal <0.5) and then Glucose elevated 193 (probably the steroids). Liver parameters not elevated. CBC diff normal in June 2023    --> to Macrobid (Nitrofurantoin) about 10 years ago after few days also fever, rash and chills and afterwards peeling    --> cannot take Advil or Aleve because of gastric bypass surgery (2014). If pain takes only Tylenol. Cannot remember taking Tylenol. Took Tylenol after Jan 2023 without problems        Past Medical History:   Patient Active Problem List   Diagnosis     Intractable tension-type headache, unspecified chronicity pattern     Neck pain     Concussion, without loss of consciousness, initial  encounter     No past medical history on file.    Allergies:  Allergies   Allergen Reactions     Ciprofloxacin Dermatitis     DRESS to Ciprofloxacine     Nitrofurantoin Dermatitis     DRESS like symptoms to Macrobid     Codeine      Nausea      Lisinopril      coughing     Sulfa Antibiotics        Medications:  Current Outpatient Medications   Medication     AMITRIPTYLINE HCL PO     BENZONATATE PO     cyanocobalamin (VITAMIN B12) 1000 MCG/ML injection     LEVOTHYROXINE SODIUM PO     meclizine 25 MG CHEW     Ondansetron (ZOFRAN ODT PO)     pantoprazole (PROTONIX) suspension     polyethylene glycol (MIRALAX/GLYCOLAX) Packet     promethazine (PHENERGAN) 25 MG tablet     sennosides (SENOKOT) 8.6 MG tablet     Simvastatin (ZOCOR PO)     sucralfate (CARAFATE) 1 GM tablet     TRAMADOL HCL PO     TRAZODONE HCL PO     VITAMIN D, CHOLECALCIFEROL, PO     zinc oxide 20 % ointment     No current facility-administered medications for this visit.       Social History:  The patient is retired. Patient has the following hobbies or non-occupational exposure none    Family History:  Family History   Problem Relation Age of Onset     Parkinsonism Brother        Previous Labs, Allergy Tests, Dermatopathology, Imaging:    Dr. DEMETRIUS Casarez consult 02/07/2023  HPI: Shirlene has history of possible allergy to drug. Recently had urinary tract infection. This was on January 2. She took a course of Ceftin. Completed a course. Did not feel better. Thereafter started Cipro. Finished 3-day course. Then after 3 days she developed chills and on the fourth day developed fever and rash on the body. Rash was head to toe along with fever. She went to the emergency room. Along with fever she also had joint aches and pains. She wonders if this was an allergic reaction from the drug.      Referred By: Jonathan Casarez MD  [address] [place] [address]     Allergy Tests:    Past Allergy Test    Order for Future Allergy Testing:    [] Outpatient  []  Inpatient: [facility]..../ Bed ....       Skin Atopy (atopic dermatitis) [] Yes   [x] No .........  Contact allergies:   [] Yes   [x] No ..........  Hand eczema:   [] Yes   [x] No           Leading hand:   [] R   [] L       [] Ambidextrous         Drug allergies:        [x] Yes   [] No  Which?.see above    Urticaria/Angioedema  [] Yes   [x] No .........  Food Allergy:  [] Yes   [x] No  which?......  Pets :  [] Yes   [x] No  Which?..no reactions to pets      []  Rhinitis   [] Conjunctivitis   [] Sinusitis   [] Polyposis   [] Otitis   [x] Pharyngitis         [x]  Postnasal drip    []  none  Operations:   [] Tonsils   [] Septum   [] Sinus   [] Polyposis        [] Asthma bronchiale   [x] Coughing      []  none  Symptoms (mostly Rhinoconjunctivitis and Asthma) aggravated by:  Season   [] I   [] II   [] III   [] IV   []V   []VI   []VII   []VIII   []IX   []X   []XI   []XII     [x] perennial   Day time      [] morning   [] noon      [] evening        [] night    [] whole day........  [x]  none  Location/changes    [] inside        [] outside   [] mountains    [] sea     [] others.............   [x]  none  Triggers, specific     [] animals     [] plants     [] dust              [] others ...........................    [x]  none  Triggers, others       [] work          [] psyche    [] sport            [] others .............................  [x]  none  Irritant                [] phys efforts [] smoke    [] heat/cold     [] odors  []others............... [x]  none    Order for PATCH TESTS  Reason for tests (suspected allergy): not necessary  Known previous allergies: n/a  Standardized panels  [] Standard panel (40 tests)  [] Preservatives & Antimicrobials (31 tests)  [] Emulsifiers & Additives (25 tests)   [] Perfumes/Flavours & Plants (25 tests)  [] Hairdresser panel (12 tests)  [] Rubber Chemicals (22 tests)  [] Plastics (26 tests)  [] Colorants/Dyes/Food additives (20 tests)  [] Metals (implants/dental) (24 tests)  [] Local  anaesthetics/NSAIDs (13 tests)  [] Antibiotics & Antimycotics (14 tests)   [] Corticosteroids (15 tests)   [] Photopatch test (62 tests)   [] others: ...      [] Patient's own products: ...    DO NOT test if chemical or biological identity is unknown!     always ask from patient the product information and safety sheets (MSDS)       Order for PRICK TESTS    Reason for tests (suspected allergy): atopy and perennial coughing and PND and Rhinitis  Known previous allergies: previous prick tests negatives    Standardized prick panels  [x] Atopic panel (20 tests)  [] Pediatric Panel (12 tests)  [] Milk, Meat, Eggs, Grains (20 tests)   [] Dust, Epithelia, Feathers (10 tests)  [] Fish, Seafood, Shellfish (17 tests)  [] Nuts, Beans (14 tests)  [] Spice, Vegetable, Fruit (17 tests)  [] Pollen Panel = Tree, Grass, Weed (24 tests)  [] Others: ...      [] Patient's own products: ...    DO NOT test if chemical or biological identity is unknown!     always ask from patient the product information and safety sheets (MSDS)     Standardized intradermal tests  [x] Penicillium notatum [x] Aspergillus fumigatus [x] House dust mites D.far & D. pteron  [] Cat    [] dog  [x] Others: Alternaria  [] Bee venom   [] Wasp venom  !!Specific protocol with dilutions!!       Order for Drug allergy tests (prick & Intradermal & patch tests)    [] Penicillin G  [] Ampicillin [] Cefazolin   [] Ceftriaxone   [] Ceftazidime  [x] Bactrim    [x] Others: Nitrofurantoin, Ciprofloxacin, Moxifloxacin  Order for ... as test date      Atopy Screen (Placed Jun 28, 2023)  No Substance Readings (15 min) Evaluation   POS Histamine 1mg/ml +    NEG NaCl 0.9% -      No Substance Readings (15 min) Evaluation   1 Alternaria alternata (tenuis)  -    2 Cladosporium herbarum -    3 Aspergillus fumigatus -    4 Penicillium notatum -    5 Dermatophagoides pteronyssinus -    6 Dermatophagoides farinae -    7 Dog epithelium (canis spp) -    8 Cat hair (adrian catus) -    9  Cockroach   (Blatella americana & germanica) -    10 Grass mix midwest   (Brittany, Orchard, Redtop, Sergio) -    11 Enzo grass (sorghum halepense) -    12 Weed mix   (common Cocklebur, Lamb s quarters, rough redroot Pigweed, Dock/Sorrel) -    13 Mug wort (artemisia vulgare) -    14 Ragweed giant/short (ambrosia spp) -    15 White birch (Betula papyrifera) -    16 Tree mix 1 (Pecan, Maple BHR, Oak RVW, american [place], black [place]) -    17 Red cedar (juniperus virginia) -    18 Tree mix 2   (white Vel, river/red Birch, black [place], common [place], american Elm) -    19 Box elder/Maple mix (acer spp) -    20 [place] shagbark (carya ovata) -           Conclusion: no clear signs for immediate type reaction in prick test      Intradermal Testing (Placed Aug 8, 2023)  No Substance Conc.  Reading (15min)  immediate Papule [mm] / Erythema [mm] Reading   (... days)  delayed Papule [mm] / Erythema [mm] Remarks   DF Standard Dust Mite - D. Farinae 1:10 -   -    DP Standard Dust Mite - D. Pteronyssinus 1:10 -   -    A Aspergillus fumigatus  1:10 -   -    P Penicillium notatum 1:10 -   -     Alternaria alt 1:10 -   -    Conclusions: no immediate type reaction. We will observe if any delayed reaction        DRUG ALLERGY TEST SERIES Aug 8, 2023      No Substance Readings (15min) Evaluation   POS Histamine 1mg/ml +    NEG NaCl 0.9% -      ANTIBIOTICS    Prick Tests         Substance/ Allergen Conc Result (20 min) Remarks   1 Ciprofloxacin 2 mg/ml -    2 Moxifloxacin 400 mg -    3 Bactrim 80/400 mg/ml -    4 Nitrofurantoin Caps powder 100mg     5 Nitrofurantoin Caps yellow center ball 100mg        Intradermal Tests   immed immed delay delay      Substance Conc 1st dil  2nd dil  2 days  4 days remarks   1 Ciprofloxacin 1:1000 -       3 Bactrim 1:100 -           Patch Tests  as is as is 1:2 1:2     As Is  Vas Substance Conc 2 days 4 days 2 days 4 days remarks   1 2 Ciprofloxacin 2 mg/ml        3 4 Moxifloxacin 400 mg        5  6 Bactrim 80/400 mg/ml        7 8 Nitrofurantoin Caps powder 100mg        9 10 Nitrofurantoin Caps yellow center ball 100mg        Remove the patch tests on Thursday and draw the grid and then delayed readings and conclusions on Friday    ________________________________    Assessment & Plan:    ==> Final Diagnosis:     # multiple drug allergies    Suspicion for DRESS to Ciprofloxacine    Suspicion for DRESS? to Nitrofurantoin    Sulfa drugs (Bactrim) cannot remember    Azithromycine GI issues (pain) = side effects  * acute illness with systemic symptoms    # suspicion for atopic predisposition with    Perennial PND, Rhinitis and coughing = allergic?  * stable chronic illness      These conclusions are made at the best of one's knowledge and belief based on the provided evidence such as patient's history and allergy test results and they can change over time or can be incomplete because of missing information's.    ==> Treatment Plan:  >> put Ciprofloxacin and Nitrofurantoin in Epic drug allergy list      Procedures Performed: Allergy tests, including intradermal and patch tests and drug allergy tests    Staff: : provider    Follow-up in Derm-Allergy clinic for delayed readings on Friday  ___________________________    I spent a total of 40 minutes with Shirlene Mena during today s  visit. This time was spent discussing all the individual test results, correlating them to the clinical relevance, counseling the patient and/or coordinating care

## 2023-08-08 ENCOUNTER — OFFICE VISIT (OUTPATIENT)
Dept: ALLERGY | Facility: CLINIC | Age: 68
End: 2023-08-08
Payer: COMMERCIAL

## 2023-08-08 DIAGNOSIS — R05.3 CHRONIC COUGH: ICD-10-CM

## 2023-08-08 DIAGNOSIS — T88.7XXA DRUG-INDUCED HYPERSENSITIVITY SYNDROME, INITIAL ENCOUNTER: ICD-10-CM

## 2023-08-08 DIAGNOSIS — J30.9 ALLERGIC RHINITIS WITH POSTNASAL DRIP: ICD-10-CM

## 2023-08-08 DIAGNOSIS — R09.82 ALLERGIC RHINITIS WITH POSTNASAL DRIP: ICD-10-CM

## 2023-08-08 DIAGNOSIS — Z88.9 MULTIPLE DRUG ALLERGIES: Primary | ICD-10-CM

## 2023-08-08 DIAGNOSIS — T50.905A DRUG-INDUCED HYPERSENSITIVITY SYNDROME, INITIAL ENCOUNTER: ICD-10-CM

## 2023-08-08 PROCEDURE — 95024 IQ TESTS W/ALLERGENIC XTRCS: CPT | Performed by: DERMATOLOGY

## 2023-08-08 PROCEDURE — 95044 PATCH/APPLICATION TESTS: CPT | Performed by: DERMATOLOGY

## 2023-08-08 PROCEDURE — 99214 OFFICE O/P EST MOD 30 MIN: CPT | Mod: 25 | Performed by: DERMATOLOGY

## 2023-08-08 PROCEDURE — 95018 ALL TSTG PERQ&IQ DRUGS/BIOL: CPT | Performed by: DERMATOLOGY

## 2023-08-08 PROCEDURE — 95004 PERQ TESTS W/ALRGNC XTRCS: CPT | Performed by: DERMATOLOGY

## 2023-08-08 RX ADMIN — NITROFURANTOIN 100 MG: 25; 75 CAPSULE ORAL at 08:30

## 2023-08-08 RX ADMIN — MOXIFLOXACIN HYDROCHLORIDE 400 MG: 400 TABLET ORAL at 08:30

## 2023-08-08 NOTE — PATIENT INSTRUCTIONS
Removal of Patch Tests on Day 3:    Remove patches and tape from one test area (one rectangle)          Using the purple surgical markers provided (or other permanent marker), draw a grid around the test area so that the circular indentation is in the center of each square, as below. Try to be as neat as possible and keep the lines as straight as you can (you can use a ruler if you need to)          Redraw the number that was underneath the tape above the grids, as shown below. Try to print clearly.          Repeat the process for the remaining test areas.          Photograph the entire area then take close-up photos of any possible reactions. Examples of possible reactions are spots that look like these:          Return to clinic for evaluation as instructed. You should continue to avoid getting the area wet (no showering or strenuous exercise), exposing the area to UV light, using topical steroids, or scratching.         Who should I call with questions?  [facility], [place]: [phone]  For urgent needs outside of business hours call the [facility] at [phone] and ask for the dermatology resident on call      If you develop any serious or adverse allergic reaction after office hours please seek immediate medical assistance at the nearest clinic or emergency room

## 2023-08-08 NOTE — NURSING NOTE
Chief Complaint   Patient presents with    Allergy Recheck     Drug testing Bactrim, Nitrofurantoin CAM, Ciprofloxacin (P), Moxifloxacin CAM   Tari ENCISO RN-BSN  Dermatology Surgery  [phone]

## 2023-08-08 NOTE — Clinical Note
8/8/2023         RE: Shirlene Mena  [address]  [place] [address]        Dear Colleague,    Thank you for referring your patient, Shirlene Mena, to the [facility] [place]. Please see a copy of my visit note below.    [facility] Dermato-allergology Note  Office visit  Encounter Date: Aug 8, 2023  ____________________________________________    CC: No chief complaint on file.      HPI:  (Aug 8, 2023)  Ms. Shirlene Mena is a(n) 68 year old female who presents today as a return patient for allergy consultation  - Follow-up in Derm-Allergy clinic for drug allergy testing with delayed reading appointment 4-5 days later. Will do then as well IDT to environmental allergies  - otherwise feeling well in usual state of health    Physical exam:  General: In no acute distress, well-developed, well-nourished  Eyes: no conjunctivitis  ENT: no signs of rhinitis   Pulmonary: no wheezing or coughing  Skin:Focused examination of the skin on test sites was performed = see test results below    Earlier History and Allergy exams:  (Jun 28, 2023)    - patient has recurrent UTI's   --> see below the hx of Dr. Casarez.   Was taking 3 days the Ciprofloxacine and then developed fever, joint pain and a rash (first on arms and then all over the body, including face). Seems to be an erythematous, macular, confluent rash with pruritus. 3 weeks later the skin was peeling off.  --> labs from 1/20/2023 (2 weeks after onset of rash) = still elevated CRP 8.78 (UTI?), and still elevated Eosinophils absolute (0.8 normal <0.5) and then Glucose elevated 193 (probably the steroids). Liver parameters not elevated. CBC diff normal in June 2023    --> to Macrobid (Nitrofurantoin) about 10 years ago after few days also fever, rash and chills and afterwards peeling    --> cannot take Advil or Aleve because of gastric bypass surgery (2014). If pain takes only Tylenol. Cannot remember taking Tylenol. Took  Tylenol after Jan 2023 without problems        Past Medical History:   Patient Active Problem List   Diagnosis     Intractable tension-type headache, unspecified chronicity pattern     Neck pain     Concussion, without loss of consciousness, initial encounter     No past medical history on file.    Allergies:  Allergies   Allergen Reactions     Ciprofloxacin Dermatitis     DRESS to Ciprofloxacine     Nitrofurantoin Dermatitis     DRESS like symptoms to Macrobid     Codeine      Nausea      Lisinopril      coughing     Sulfa Antibiotics        Medications:  Current Outpatient Medications   Medication     AMITRIPTYLINE HCL PO     BENZONATATE PO     cyanocobalamin (VITAMIN B12) 1000 MCG/ML injection     LEVOTHYROXINE SODIUM PO     meclizine 25 MG CHEW     Ondansetron (ZOFRAN ODT PO)     pantoprazole (PROTONIX) suspension     polyethylene glycol (MIRALAX/GLYCOLAX) Packet     promethazine (PHENERGAN) 25 MG tablet     sennosides (SENOKOT) 8.6 MG tablet     Simvastatin (ZOCOR PO)     sucralfate (CARAFATE) 1 GM tablet     TRAMADOL HCL PO     TRAZODONE HCL PO     VITAMIN D, CHOLECALCIFEROL, PO     zinc oxide 20 % ointment     No current facility-administered medications for this visit.       Social History:  The patient is retired. Patient has the following hobbies or non-occupational exposure none    Family History:  Family History   Problem Relation Age of Onset     Parkinsonism Brother        Previous Labs, Allergy Tests, Dermatopathology, Imaging:    Dr. DEMETRIUS Casarez consult 02/07/2023  HPI: Shirlene has history of possible allergy to drug. Recently had urinary tract infection. This was on January 2. She took a course of Ceftin. Completed a course. Did not feel better. Thereafter started Cipro. Finished 3-day course. Then after 3 days she developed chills and on the fourth day developed fever and rash on the body. Rash was head to toe along with fever. She went to the emergency room. Along with fever she also had joint aches  and pains. She wonders if this was an allergic reaction from the drug.      Referred By: Jonathan Casarez MD  [address] [place] [address]     Allergy Tests:    Past Allergy Test    Order for Future Allergy Testing:    [] Outpatient  [] Inpatient: [facility]..../ Bed ....       Skin Atopy (atopic dermatitis) [] Yes   [x] No .........  Contact allergies:   [] Yes   [x] No ..........  Hand eczema:   [] Yes   [x] No           Leading hand:   [] R   [] L       [] Ambidextrous         Drug allergies:        [x] Yes   [] No  Which?.see above    Urticaria/Angioedema  [] Yes   [x] No .........  Food Allergy:  [] Yes   [x] No  which?......  Pets :  [] Yes   [x] No  Which?..no reactions to pets      []  Rhinitis   [] Conjunctivitis   [] Sinusitis   [] Polyposis   [] Otitis   [x] Pharyngitis         [x]  Postnasal drip    []  none  Operations:   [] Tonsils   [] Septum   [] Sinus   [] Polyposis        [] Asthma bronchiale   [x] Coughing      []  none  Symptoms (mostly Rhinoconjunctivitis and Asthma) aggravated by:  Season   [] I   [] II   [] III   [] IV   []V   []VI   []VII   []VIII   []IX   []X   []XI   []XII     [x] perennial   Day time      [] morning   [] noon      [] evening        [] night    [] whole day........  [x]  none  Location/changes    [] inside        [] outside   [] mountains    [] sea     [] others.............   [x]  none  Triggers, specific     [] animals     [] plants     [] dust              [] others ...........................    [x]  none  Triggers, others       [] work          [] psyche    [] sport            [] others .............................  [x]  none  Irritant                [] phys efforts [] smoke    [] heat/cold     [] odors  []others............... [x]  none    Order for PATCH TESTS  Reason for tests (suspected allergy): not necessary  Known previous allergies: n/a  Standardized panels  [] Standard panel (40 tests)  [] Preservatives & Antimicrobials (31 tests)  [] Emulsifiers &  Additives (25 tests)   [] Perfumes/Flavours & Plants (25 tests)  [] Hairdresser panel (12 tests)  [] Rubber Chemicals (22 tests)  [] Plastics (26 tests)  [] Colorants/Dyes/Food additives (20 tests)  [] Metals (implants/dental) (24 tests)  [] Local anaesthetics/NSAIDs (13 tests)  [] Antibiotics & Antimycotics (14 tests)   [] Corticosteroids (15 tests)   [] Photopatch test (62 tests)   [] others: ...      [] Patient's own products: ...    DO NOT test if chemical or biological identity is unknown!     always ask from patient the product information and safety sheets (MSDS)       Order for PRICK TESTS    Reason for tests (suspected allergy): atopy and perennial coughing and PND and Rhinitis  Known previous allergies: previous prick tests negatives    Standardized prick panels  [x] Atopic panel (20 tests)  [] Pediatric Panel (12 tests)  [] Milk, Meat, Eggs, Grains (20 tests)   [] Dust, Epithelia, Feathers (10 tests)  [] Fish, Seafood, Shellfish (17 tests)  [] Nuts, Beans (14 tests)  [] Spice, Vegetable, Fruit (17 tests)  [] Pollen Panel = Tree, Grass, Weed (24 tests)  [] Others: ...      [] Patient's own products: ...    DO NOT test if chemical or biological identity is unknown!     always ask from patient the product information and safety sheets (MSDS)     Standardized intradermal tests  [x] Penicillium notatum [x] Aspergillus fumigatus [x] House dust mites D.far & D. pteron  [] Cat    [] dog  [x] Others: Alternaria  [] Bee venom   [] Wasp venom  !!Specific protocol with dilutions!!       Order for Drug allergy tests (prick & Intradermal & patch tests)    [] Penicillin G  [] Ampicillin [] Cefazolin   [] Ceftriaxone   [] Ceftazidime  [x] Bactrim    [x] Others: Nitrofurantoin, Ciprofloxacin, Moxifloxacin  Order for ... as test date      Atopy Screen (Placed Jun 28, 2023)  No Substance Readings (15 min) Evaluation   POS Histamine 1mg/ml +    NEG NaCl 0.9% -      No Substance Readings (15 min) Evaluation   1 Alternaria  alternata (tenuis)  -    2 Cladosporium herbarum -    3 Aspergillus fumigatus -    4 Penicillium notatum -    5 Dermatophagoides pteronyssinus -    6 Dermatophagoides farinae -    7 Dog epithelium (canis spp) -    8 Cat hair (adrian catus) -    9 Cockroach   (Blatella americana & germanica) -    10 Grass mix midwest   (Brittany, Orchard, Redtop, Sergio) -    11 Enzo grass (sorghum halepense) -    12 Weed mix   (common Cocklebur, Lamb s quarters, rough redroot Pigweed, Dock/Sorrel) -    13 Mug wort (artemisia vulgare) -    14 Ragweed giant/short (ambrosia spp) -    15 White birch (Betula papyrifera) -    16 Tree mix 1 (Pecan, Maple BHR, Oak RVW, american [place], black [place]) -    17 Red cedar (juniperus virginia) -    18 Tree mix 2   (white Vel, river/red Birch, black [place], common [place], american Elm) -    19 Box elder/Maple mix (acer spp) -    20 [place] shagbark (carya ovata) -           Conclusion: no clear signs for immediate type reaction in prick test      Intradermal Testing (Placed Aug 8, 2023)  No Substance Conc.  Reading (15min)  immediate Papule [mm] / Erythema [mm] Reading   (... days)  delayed Papule [mm] / Erythema [mm] Remarks   DF Standard Dust Mite - D. Farinae 1:10 -   -    DP Standard Dust Mite - D. Pteronyssinus 1:10 -   -    A Aspergillus fumigatus  1:10 -   -    P Penicillium notatum 1:10 -   -     Alternaria alt 1:10 -   -    Conclusions: no immediate type reaction. We will observe if any delayed reaction        DRUG ALLERGY TEST SERIES Aug 8, 2023      No Substance Readings (15min) Evaluation   POS Histamine 1mg/ml +    NEG NaCl 0.9% -      ANTIBIOTICS    Prick Tests         Substance/ Allergen Conc Result (20 min) Remarks   1 Ciprofloxacin 2 mg/ml -    2 Moxifloxacin 400 mg -    3 Bactrim 80/400 mg/ml -    4 Nitrofurantoin Caps powder 100mg     5 Nitrofurantoin Caps yellow center ball 100mg        Intradermal Tests   immed immed delay delay      Substance Conc 1st dil  2nd dil  2  days  4 days remarks   1 Ciprofloxacin 1:1000 -       3 Bactrim 1:100 -           Patch Tests  as is as is 1:2 1:2     As Is  Vas Substance Conc 2 days 4 days 2 days 4 days remarks   1 2 Ciprofloxacin 2 mg/ml        3 4 Moxifloxacin 400 mg        5 6 Bactrim 80/400 mg/ml        7 8 Nitrofurantoin Caps powder 100mg        9 10 Nitrofurantoin Caps yellow center ball 100mg        Remove the patch tests on Thursday and draw the grid and then delayed readings and conclusions on Friday    ________________________________    Assessment & Plan:    ==> Final Diagnosis:     # multiple drug allergies    Suspicion for DRESS to Ciprofloxacine    Suspicion for DRESS? to Nitrofurantoin    Sulfa drugs (Bactrim) cannot remember    Azithromycine GI issues (pain) = side effects  * acute illness with systemic symptoms    # suspicion for atopic predisposition with    Perennial PND, Rhinitis and coughing = allergic?  * stable chronic illness      These conclusions are made at the best of one's knowledge and belief based on the provided evidence such as patient's history and allergy test results and they can change over time or can be incomplete because of missing information's.    ==> Treatment Plan:  >> put Ciprofloxacin and Nitrofurantoin in Epic drug allergy list        Procedures Performed: Allergy tests, including prick, intradermal and patch tests and drug allergy or provocation tests***    {[email or web address]}: provider    Follow-up in Derm-Allergy clinic ***  ___________________________    I spent a total of *** minutes with Shirlene Mena during today s  visit. This time was spent discussing all the individual test results, correlating them to the clinical relevance, counseling the patient and/or coordinating care and performing allergy tests or procedures.           Again, thank you for allowing me to participate in the care of your patient.        Sincerely,        Frantz Coombs MD

## 2023-08-10 NOTE — PROGRESS NOTES
[facility] Dermato-allergology Note  Office visit  Encounter Date: Aug 11, 2023  ____________________________________________    CC: No chief complaint on file.      HPI:  (Aug 11, 2023)  Ms. Shirlene Mena is a(n) 68 year old female who presents today as a return patient for allergy consultation  - Follow-up in Derm-Allergy clinic for delayed readings on Friday  - otherwise feeling well in usual state of health    Physical exam:  General: In no acute distress, well-developed, well-nourished  Eyes: no conjunctivitis  ENT: no signs of rhinitis   Pulmonary: no wheezing or coughing  Skin:Focused examination of the skin on test sites was performed = see test results below    Earlier History and Allergy exams:  (Aug 8, 2023)  - Follow-up in Derm-Allergy clinic for drug allergy testing with delayed reading appointment 4-5 days later. Will do then as well IDT to environmental allergies    Earlier History and Allergy exams:  (Jun 28, 2023)  - patient has recurrent UTI's   --> see below the hx of Dr. Casarez.   Was taking 3 days the Ciprofloxacine and then developed fever, joint pain and a rash (first on arms and then all over the body, including face). Seems to be an erythematous, macular, confluent rash with pruritus. 3 weeks later the skin was peeling off.  --> labs from 1/20/2023 (2 weeks after onset of rash) = still elevated CRP 8.78 (UTI?), and still elevated Eosinophils absolute (0.8 normal <0.5) and then Glucose elevated 193 (probably the steroids). Liver parameters not elevated. CBC diff normal in June 2023  --> to Macrobid (Nitrofurantoin) about 10 years ago after few days also fever, rash and chills and afterwards peeling  --> cannot take Advil or Aleve because of gastric bypass surgery (2014). If pain takes only Tylenol. Cannot remember taking Tylenol. Took Tylenol after Jan 2023 without problems      Past Medical History:   Patient Active Problem List   Diagnosis    Intractable tension-type  headache, unspecified chronicity pattern    Neck pain    Concussion, without loss of consciousness, initial encounter     No past medical history on file.    Allergies:  Allergies   Allergen Reactions    Ciprofloxacin Dermatitis     DRESS to Ciprofloxacine    Nitrofurantoin Dermatitis     DRESS like symptoms to Macrobid    Lisinopril Cough     coughing    Codeine GI Disturbance     Nausea     Sulfa Antibiotics        Medications:  Current Outpatient Medications   Medication    AMITRIPTYLINE HCL PO    BENZONATATE PO    cyanocobalamin (VITAMIN B12) 1000 MCG/ML injection    LEVOTHYROXINE SODIUM PO    meclizine 25 MG CHEW    Ondansetron (ZOFRAN ODT PO)    pantoprazole (PROTONIX) suspension    polyethylene glycol (MIRALAX/GLYCOLAX) Packet    promethazine (PHENERGAN) 25 MG tablet    sennosides (SENOKOT) 8.6 MG tablet    Simvastatin (ZOCOR PO)    sucralfate (CARAFATE) 1 GM tablet    TRAMADOL HCL PO    TRAZODONE HCL PO    VITAMIN D, CHOLECALCIFEROL, PO    zinc oxide 20 % ointment     No current facility-administered medications for this visit.       Social History:  The patient is retired. Patient has the following hobbies or non-occupational exposure none    Family History:  Family History   Problem Relation Age of Onset    Parkinsonism Brother        Previous Labs, Allergy Tests, Dermatopathology, Imaging:    Dr. DEMETRIUS Casarez consult 02/07/2023  HPI: Shirlene has history of possible allergy to drug. Recently had urinary tract infection. This was on January 2. She took a course of Ceftin. Completed a course. Did not feel better. Thereafter started Cipro. Finished 3-day course. Then after 3 days she developed chills and on the fourth day developed fever and rash on the body. Rash was head to toe along with fever. She went to the emergency room. Along with fever she also had joint aches and pains. She wonders if this was an allergic reaction from the drug.      Referred By: Jonathan Casarez MD  [address] [place] [address]  [id number]     Allergy Tests:    Past Allergy Test    Order for Future Allergy Testing:    [] Outpatient  [] Inpatient: [facility]..../ Bed ....       Skin Atopy (atopic dermatitis) [] Yes   [x] No .........  Contact allergies:   [] Yes   [x] No ..........  Hand eczema:   [] Yes   [x] No           Leading hand:   [] R   [] L       [] Ambidextrous         Drug allergies:        [x] Yes   [] No  Which?.see above    Urticaria/Angioedema  [] Yes   [x] No .........  Food Allergy:  [] Yes   [x] No  which?......  Pets :  [] Yes   [x] No  Which?..no reactions to pets      []  Rhinitis   [] Conjunctivitis   [] Sinusitis   [] Polyposis   [] Otitis   [x] Pharyngitis         [x]  Postnasal drip    []  none  Operations:   [] Tonsils   [] Septum   [] Sinus   [] Polyposis        [] Asthma bronchiale   [x] Coughing      []  none  Symptoms (mostly Rhinoconjunctivitis and Asthma) aggravated by:  Season   [] I   [] II   [] III   [] IV   []V   []VI   []VII   []VIII   []IX   []X   []XI   []XII     [x] perennial   Day time      [] morning   [] noon      [] evening        [] night    [] whole day........  [x]  none  Location/changes    [] inside        [] outside   [] mountains    [] sea     [] others.............   [x]  none  Triggers, specific     [] animals     [] plants     [] dust              [] others ...........................    [x]  none  Triggers, others       [] work          [] psyche    [] sport            [] others .............................  [x]  none  Irritant                [] phys efforts [] smoke    [] heat/cold     [] odors  []others............... [x]  none    Order for PATCH TESTS  Reason for tests (suspected allergy): not necessary  Known previous allergies: n/a  Standardized panels  [] Standard panel (40 tests)  [] Preservatives & Antimicrobials (31 tests)  [] Emulsifiers & Additives (25 tests)   [] Perfumes/Flavours & Plants (25 tests)  [] Hairdresser panel (12 tests)  [] Rubber Chemicals (22 tests)  [] Plastics (26  tests)  [] Colorants/Dyes/Food additives (20 tests)  [] Metals (implants/dental) (24 tests)  [] Local anaesthetics/NSAIDs (13 tests)  [] Antibiotics & Antimycotics (14 tests)   [] Corticosteroids (15 tests)   [] Photopatch test (62 tests)   [] others: ...      [] Patient's own products: ...  DO NOT test if chemical or biological identity is unknown!   always ask from patient the product information and safety sheets (MSDS)       Order for PRICK TESTS    Reason for tests (suspected allergy): atopy and perennial coughing and PND and Rhinitis  Known previous allergies: previous prick tests negatives    Standardized prick panels  [x] Atopic panel (20 tests)  [] Pediatric Panel (12 tests)  [] Milk, Meat, Eggs, Grains (20 tests)   [] Dust, Epithelia, Feathers (10 tests)  [] Fish, Seafood, Shellfish (17 tests)  [] Nuts, Beans (14 tests)  [] Spice, Vegetable, Fruit (17 tests)  [] Pollen Panel = Tree, Grass, Weed (24 tests)  [] Others: ...      [] Patient's own products: ...  DO NOT test if chemical or biological identity is unknown!   always ask from patient the product information and safety sheets (MSDS)     Standardized intradermal tests  [x] Penicillium notatum [x] Aspergillus fumigatus [x] House dust mites D.far & D. pteron  [] Cat    [] dog  [x] Others: Alternaria  [] Bee venom   [] Wasp venom  !!Specific protocol with dilutions!!       Order for Drug allergy tests (prick & Intradermal & patch tests)    [] Penicillin G  [] Ampicillin [] Cefazolin   [] Ceftriaxone   [] Ceftazidime  [x] Bactrim    [x] Others: Nitrofurantoin, Ciprofloxacin, Moxifloxacin  Order for ... as test date      Atopy Screen (Placed Jun 28, 2023)  No Substance Readings (15 min) Evaluation   POS Histamine 1mg/ml +    NEG NaCl 0.9% -      No Substance Readings (15 min) Evaluation   1 Alternaria alternata (tenuis)  -    2 Cladosporium herbarum -    3 Aspergillus fumigatus -    4 Penicillium notatum -    5 Dermatophagoides pteronyssinus -    6  Dermatophagoides farinae -    7 Dog epithelium (canis spp) -    8 Cat hair (adrian catus) -    9 Cockroach   (Blatella americana & germanica) -    10 Grass mix midwest   (Brittany, Orchard, Redtop, Sergio) -    11 Enzo grass (sorghum halepense) -    12 Weed mix   (common Cocklebur, Lamb s quarters, rough redroot Pigweed, Dock/Sorrel) -    13 Mug wort (artemisia vulgare) -    14 Ragweed giant/short (ambrosia spp) -    15 White birch (Betula papyrifera) -    16 Tree mix 1 (Pecan, Maple BHR, Oak RVW, american [place], black [place]) -    17 Red cedar (juniperus virgin[place]) -    18 Tree mix 2   (white Vel, river/red Birch, black [place], common [place], american Elm) -    19 Box elder/Maple mix (acer spp) -    20 [place] shagbark (carya ovata) -           Conclusion: no clear signs for immediate type reaction in prick test      Intradermal Testing (Placed Aug 8, 2023)  No Substance Conc.  Reading (15min)  immediate Papule [mm] / Erythema [mm] Reading   (4 days)  delayed Papule [mm] / Erythema [mm] Remarks   DF Standard Dust Mite - D. Farinae 1:10 -  - -    DP Standard Dust Mite - D. Pteronyssinus 1:10 -  - -    A Aspergillus fumigatus  1:10 -  - -    P Penicillium notatum 1:10 -  - -     Alternaria alt 1:10 -  - -    Conclusions: no immediate or delayed  type reaction.   Was during immediate tests on Tizanidine      DRUG ALLERGY TEST SERIES Aug 8, 2023      No Substance Readings (15min) Evaluation   POS Histamine 1mg/ml +    NEG NaCl 0.9% -      ANTIBIOTICS    Prick Tests         Substance/ Allergen Conc Result (20 min) Remarks   1 Ciprofloxacin 2 mg/ml -    2 Moxifloxacin 400 mg -    3 Bactrim 80/400 mg/ml -    4 Nitrofurantoin Caps powder 100mg     5 Nitrofurantoin Caps yellow center ball 100mg        Intradermal Tests   immed immed delay delay      Substance Conc 1st dil  2nd dil  2 days  4 days remarks   1 Ciprofloxacin 1:1000 -   -    3 Bactrim 1:100 -   -        Patch Tests  as is as is 1:2 1:2     As Is  Vas  Substance Conc 2 days 4 days 2 days 4 days remarks   1 2 Ciprofloxacin 2 mg/ml  -  -    3 4 Moxifloxacin 400 mg  -  -    5 6 Bactrim 80/400 mg/ml  -  -    7 8 Nitrofurantoin Caps powder 100mg  -  -    9 10 Nitrofurantoin Caps yellow center ball 100mg  -  -    No signs for immediate or delayed type reactions    ________________________________    Assessment & Plan:    ==> Final Diagnosis:     # multiple drug allergies  Suspicion for DRESS to Ciprofloxacine = skin tests negatives!  Suspicion for DRESS? to Nitrofurantoin = skin tests negatives!  Sulfa drugs (Bactrim) cannot remember ==> skin tests negatives  Azithromycine GI issues (pain) = side effects  ==> either not DRESS or DRESS to another drug or false negative skin tests  * acute illness with systemic symptoms    # suspicion for atopic predisposition with  Perennial PND, Rhinitis and coughing = allergic? ==> skin tests negatives!  * stable chronic illness      These conclusions are made at the best of one's knowledge and belief based on the provided evidence such as patient's history and allergy test results and they can change over time or can be incomplete because of missing information's.    ==> Treatment Plan:    >> hx for Sulfa drugs not clear and all skin tests negatives ==> take Bactrim and Sulfa drugs off the allergy list    >> Ciprofloxacin: Clinically high suspicion for DRESS to Ciprofloxacine  Propose to avoid Ciprofloxacine despite negative skin tests.   >> Nitrofurantoin: Clinically high suspicion for DRESS to Nitrofurantoin   Propose to avoid Ciprofloxacine in future despite negative skin tests.     ___________________________    Staff: : provider    Follow-up in Derm-Allergy clinic if necessary. If again recurrence, then we need a precise and chronological drug list 1 week prior of the rash up to the rash  ___________________________    I spent a total of 36 minutes with Shirlene Mena during today s  visit. This time was spent discussing all the  individual test results, correlating them to the clinical relevance, counseling the patient and/or coordinating care

## 2023-08-11 ENCOUNTER — OFFICE VISIT (OUTPATIENT)
Dept: ALLERGY | Facility: CLINIC | Age: 68
End: 2023-08-11
Payer: COMMERCIAL

## 2023-08-11 DIAGNOSIS — Z88.9 ATOPY: ICD-10-CM

## 2023-08-11 DIAGNOSIS — T50.905A DRUG-INDUCED HYPERSENSITIVITY SYNDROME, INITIAL ENCOUNTER: ICD-10-CM

## 2023-08-11 DIAGNOSIS — T88.7XXA DRUG-INDUCED HYPERSENSITIVITY SYNDROME, INITIAL ENCOUNTER: ICD-10-CM

## 2023-08-11 DIAGNOSIS — Z88.9 MULTIPLE DRUG ALLERGIES: Primary | ICD-10-CM

## 2023-08-11 DIAGNOSIS — T50.905D ADVERSE EFFECT OF DRUG, SUBSEQUENT ENCOUNTER: ICD-10-CM

## 2023-08-11 PROCEDURE — 99214 OFFICE O/P EST MOD 30 MIN: CPT | Performed by: DERMATOLOGY

## 2023-08-11 NOTE — LETTER
8/11/2023         RE: Shirlene Mena  [address]  [place] [address]        Dear Colleague,    Thank you for referring your patient, Shirlene Mena, to the [facility] [place]. Please see a copy of my visit note below.    [facility] D[facility] Note  Office visit  Encounter Date: Aug 11, 2023  ____________________________________________    CC: No chief complaint on file.      HPI:  (Aug 11, 2023)  Ms. Shirlene Mena is a(n) 68 year old female who presents today as a return patient for allergy consultation  - Follow-up in Derm-Allergy clinic for delayed readings on Friday  - otherwise feeling well in usual state of health    Physical exam:  General: In no acute distress, well-developed, well-nourished  Eyes: no conjunctivitis  ENT: no signs of rhinitis   Pulmonary: no wheezing or coughing  Skin:Focused examination of the skin on test sites was performed = see test results below    Earlier History and Allergy exams:  (Aug 8, 2023)  - Follow-up in Derm-Allergy clinic for drug allergy testing with delayed reading appointment 4-5 days later. Will do then as well IDT to environmental allergies    Earlier History and Allergy exams:  (Jun 28, 2023)  - patient has recurrent UTI's   --> see below the hx of Dr. Casarez.   Was taking 3 days the Ciprofloxacine and then developed fever, joint pain and a rash (first on arms and then all over the body, including face). Seems to be an erythematous, macular, confluent rash with pruritus. 3 weeks later the skin was peeling off.  --> labs from 1/20/2023 (2 weeks after onset of rash) = still elevated CRP 8.78 (UTI?), and still elevated Eosinophils absolute (0.8 normal <0.5) and then Glucose elevated 193 (probably the steroids). Liver parameters not elevated. CBC diff normal in June 2023  --> to Macrobid (Nitrofurantoin) about 10 years ago after few days also fever, rash and chills and afterwards peeling  --> cannot take Advil or  Aleve because of gastric bypass surgery (2014). If pain takes only Tylenol. Cannot remember taking Tylenol. Took Tylenol after Jan 2023 without problems      Past Medical History:   Patient Active Problem List   Diagnosis     Intractable tension-type headache, unspecified chronicity pattern     Neck pain     Concussion, without loss of consciousness, initial encounter     No past medical history on file.    Allergies:  Allergies   Allergen Reactions     Ciprofloxacin Dermatitis     DRESS to Ciprofloxacine     Nitrofurantoin Dermatitis     DRESS like symptoms to Macrobid     Lisinopril Cough     coughing     Codeine GI Disturbance     Nausea      Sulfa Antibiotics        Medications:  Current Outpatient Medications   Medication     AMITRIPTYLINE HCL PO     BENZONATATE PO     cyanocobalamin (VITAMIN B12) 1000 MCG/ML injection     LEVOTHYROXINE SODIUM PO     meclizine 25 MG CHEW     Ondansetron (ZOFRAN ODT PO)     pantoprazole (PROTONIX) suspension     polyethylene glycol (MIRALAX/GLYCOLAX) Packet     promethazine (PHENERGAN) 25 MG tablet     sennosides (SENOKOT) 8.6 MG tablet     Simvastatin (ZOCOR PO)     sucralfate (CARAFATE) 1 GM tablet     TRAMADOL HCL PO     TRAZODONE HCL PO     VITAMIN D, CHOLECALCIFEROL, PO     zinc oxide 20 % ointment     No current facility-administered medications for this visit.       Social History:  The patient is retired. Patient has the following hobbies or non-occupational exposure none    Family History:  Family History   Problem Relation Age of Onset     Parkinsonism Brother        Previous Labs, Allergy Tests, Dermatopathology, Imaging:    Dr. DEMETRIUS Casarez consult 02/07/2023  HPI: Shirlene has history of possible allergy to drug. Recently had urinary tract infection. This was on January 2. She took a course of Ceftin. Completed a course. Did not feel better. Thereafter started Cipro. Finished 3-day course. Then after 3 days she developed chills and on the fourth day developed fever and  rash on the body. Rash was head to toe along with fever. She went to the emergency room. Along with fever she also had joint aches and pains. She wonders if this was an allergic reaction from the drug.      Referred By: Jonathan Casarez MD  [address] [place] [address]     Allergy Tests:    Past Allergy Test    Order for Future Allergy Testing:    [] Outpatient  [] Inpatient: [facility]..../ Bed ....       Skin Atopy (atopic dermatitis) [] Yes   [x] No .........  Contact allergies:   [] Yes   [x] No ..........  Hand eczema:   [] Yes   [x] No           Leading hand:   [] R   [] L       [] Ambidextrous         Drug allergies:        [x] Yes   [] No  Which?.see above    Urticaria/Angioedema  [] Yes   [x] No .........  Food Allergy:  [] Yes   [x] No  which?......  Pets :  [] Yes   [x] No  Which?..no reactions to pets      []  Rhinitis   [] Conjunctivitis   [] Sinusitis   [] Polyposis   [] Otitis   [x] Pharyngitis         [x]  Postnasal drip    []  none  Operations:   [] Tonsils   [] Septum   [] Sinus   [] Polyposis        [] Asthma bronchiale   [x] Coughing      []  none  Symptoms (mostly Rhinoconjunctivitis and Asthma) aggravated by:  Season   [] I   [] II   [] III   [] IV   []V   []VI   []VII   []VIII   []IX   []X   []XI   []XII     [x] perennial   Day time      [] morning   [] noon      [] evening        [] night    [] whole day........  [x]  none  Location/changes    [] inside        [] outside   [] mountains    [] sea     [] others.............   [x]  none  Triggers, specific     [] animals     [] plants     [] dust              [] others ...........................    [x]  none  Triggers, others       [] work          [] psyche    [] sport            [] others .............................  [x]  none  Irritant                [] phys efforts [] smoke    [] heat/cold     [] odors  []others............... [x]  none    Order for PATCH TESTS  Reason for tests (suspected allergy): not necessary  Known previous  allergies: n/a  Standardized panels  [] Standard panel (40 tests)  [] Preservatives & Antimicrobials (31 tests)  [] Emulsifiers & Additives (25 tests)   [] Perfumes/Flavours & Plants (25 tests)  [] Hairdresser panel (12 tests)  [] Rubber Chemicals (22 tests)  [] Plastics (26 tests)  [] Colorants/Dyes/Food additives (20 tests)  [] Metals (implants/dental) (24 tests)  [] Local anaesthetics/NSAIDs (13 tests)  [] Antibiotics & Antimycotics (14 tests)   [] Corticosteroids (15 tests)   [] Photopatch test (62 tests)   [] others: ...      [] Patient's own products: ...  DO NOT test if chemical or biological identity is unknown!   always ask from patient the product information and safety sheets (MSDS)       Order for PRICK TESTS    Reason for tests (suspected allergy): atopy and perennial coughing and PND and Rhinitis  Known previous allergies: previous prick tests negatives    Standardized prick panels  [x] Atopic panel (20 tests)  [] Pediatric Panel (12 tests)  [] Milk, Meat, Eggs, Grains (20 tests)   [] Dust, Epithelia, Feathers (10 tests)  [] Fish, Seafood, Shellfish (17 tests)  [] Nuts, Beans (14 tests)  [] Spice, Vegetable, Fruit (17 tests)  [] Pollen Panel = Tree, Grass, Weed (24 tests)  [] Others: ...      [] Patient's own products: ...  DO NOT test if chemical or biological identity is unknown!   always ask from patient the product information and safety sheets (MSDS)     Standardized intradermal tests  [x] Penicillium notatum [x] Aspergillus fumigatus [x] House dust mites D.far & D. pteron  [] Cat    [] dog  [x] Others: Alternaria  [] Bee venom   [] Wasp venom  !!Specific protocol with dilutions!!       Order for Drug allergy tests (prick & Intradermal & patch tests)    [] Penicillin G  [] Ampicillin [] Cefazolin   [] Ceftriaxone   [] Ceftazidime  [x] Bactrim    [x] Others: Nitrofurantoin, Ciprofloxacin, Moxifloxacin  Order for ... as test date      Atopy Screen (Placed Jun 28, 2023)  No Substance Readings (15  min) Evaluation   POS Histamine 1mg/ml +    NEG NaCl 0.9% -      No Substance Readings (15 min) Evaluation   1 Alternaria alternata (tenuis)  -    2 Cladosporium herbarum -    3 Aspergillus fumigatus -    4 Penicillium notatum -    5 Dermatophagoides pteronyssinus -    6 Dermatophagoides farinae -    7 Dog epithelium (canis spp) -    8 Cat hair (adrian catus) -    9 Cockroach   (Blatella americana & germanica) -    10 Grass mix midwest   (Brittany, Orchard, Redtop, Sergio) -    11 Enzo grass (sorghum halepense) -    12 Weed mix   (common Cocklebur, Lamb s quarters, rough redroot Pigweed, Dock/Sorrel) -    13 Mug wort (artemisia vulgare) -    14 Ragweed giant/short (ambrosia spp) -    15 White birch (Betula papyrifera) -    16 Tree mix 1 (Pecan, Maple BHR, Oak RVW, american Agra, black [place]) -    17 Red cedar (juniperus virginia) -    18 Tree mix 2   (white Vel, river/red Birch, black [place], common Miller, american Elm) -    19 Box elder/Maple mix (acer spp) -    20 [place] shagbark (carya ovata) -           Conclusion: no clear signs for immediate type reaction in prick test      Intradermal Testing (Placed Aug 8, 2023)  No Substance Conc.  Reading (15min)  immediate Papule [mm] / Erythema [mm] Reading   (4 days)  delayed Papule [mm] / Erythema [mm] Remarks   DF Standard Dust Mite - D. Farinae 1:10 -  - -    DP Standard Dust Mite - D. Pteronyssinus 1:10 -  - -    A Aspergillus fumigatus  1:10 -  - -    P Penicillium notatum 1:10 -  - -     Alternaria alt 1:10 -  - -    Conclusions: no immediate or delayed  type reaction.   Was during immediate tests on Tizanidine      DRUG ALLERGY TEST SERIES Aug 8, 2023      No Substance Readings (15min) Evaluation   POS Histamine 1mg/ml +    NEG NaCl 0.9% -      ANTIBIOTICS    Prick Tests         Substance/ Allergen Conc Result (20 min) Remarks   1 Ciprofloxacin 2 mg/ml -    2 Moxifloxacin 400 mg -    3 Bactrim 80/400 mg/ml -    4 Nitrofurantoin Caps powder 100mg     5  Nitrofurantoin Caps yellow center ball 100mg        Intradermal Tests   immed immed delay delay      Substance Conc 1st dil  2nd dil  2 days  4 days remarks   1 Ciprofloxacin 1:1000 -   -    3 Bactrim 1:100 -   -        Patch Tests  as is as is 1:2 1:2     As Is  Vas Substance Conc 2 days 4 days 2 days 4 days remarks   1 2 Ciprofloxacin 2 mg/ml  -  -    3 4 Moxifloxacin 400 mg  -  -    5 6 Bactrim 80/400 mg/ml  -  -    7 8 Nitrofurantoin Caps powder 100mg  -  -    9 10 Nitrofurantoin Caps yellow center ball 100mg  -  -    No signs for immediate or delayed type reactions    ________________________________    Assessment & Plan:    ==> Final Diagnosis:     # multiple drug allergies  Suspicion for DRESS to Ciprofloxacine = skin tests negatives!  Suspicion for DRESS? to Nitrofurantoin = skin tests negatives!  Sulfa drugs (Bactrim) cannot remember ==> skin tests negatives  Azithromycine GI issues (pain) = side effects  ==> either not DRESS or DRESS to another drug or false negative skin tests  * acute illness with systemic symptoms    # suspicion for atopic predisposition with  Perennial PND, Rhinitis and coughing = allergic? ==> skin tests negatives!  * stable chronic illness      These conclusions are made at the best of one's knowledge and belief based on the provided evidence such as patient's history and allergy test results and they can change over time or can be incomplete because of missing information's.    ==> Treatment Plan:    >> hx for Sulfa drugs not clear and all skin tests negatives ==> take Bactrim and Sulfa drugs off the allergy list    >> Ciprofloxacin: Clinically high suspicion for DRESS to Ciprofloxacine  Propose to avoid Ciprofloxacine despite negative skin tests.   >> Nitrofurantoin: Clinically high suspicion for DRESS to Nitrofurantoin   Propose to avoid Ciprofloxacine in future despite negative skin tests.     ___________________________    Staff: : provider    Follow-up in Derm-Allergy clinic if  necessary. If again recurrence, then we need a precise and chronological drug list 1 week prior of the rash up to the rash  ___________________________    I spent a total of 36 minutes with Shirlene Mena during today s  visit. This time was spent discussing all the individual test results, correlating them to the clinical relevance, counseling the patient and/or coordinating care       Again, thank you for allowing me to participate in the care of your patient.        Sincerely,        Frantz Coombs MD

## 2023-08-11 NOTE — NURSING NOTE
Dermatology Rooming Note    Shirlene Mena's goals for this visit include:   Chief Complaint   Patient presents with    Allergy Testing Followup     Shirlene is here today for a day 5 follow up      SIERRA Rinaldi

## 2023-10-07 ENCOUNTER — HEALTH MAINTENANCE LETTER (OUTPATIENT)
Age: 68
End: 2023-10-07

## 2023-12-16 ENCOUNTER — HEALTH MAINTENANCE LETTER (OUTPATIENT)
Age: 68
End: 2023-12-16

## 2024-11-30 ENCOUNTER — HEALTH MAINTENANCE LETTER (OUTPATIENT)
Age: 69
End: 2024-11-30

## 2025-01-12 ENCOUNTER — HEALTH MAINTENANCE LETTER (OUTPATIENT)
Age: 70
End: 2025-01-12

## (undated) RX ORDER — SULFAMETHOXAZOLE AND TRIMETHOPRIM 80; 16 MG/ML; MG/ML
INJECTION INTRAVENOUS
Status: DISPENSED
Start: 2023-08-08

## (undated) RX ORDER — CIPROFLOXACIN 2 MG/ML
INJECTION, SOLUTION INTRAVENOUS
Status: DISPENSED
Start: 2023-08-08